# Patient Record
Sex: FEMALE
[De-identification: names, ages, dates, MRNs, and addresses within clinical notes are randomized per-mention and may not be internally consistent; named-entity substitution may affect disease eponyms.]

---

## 2022-01-06 ENCOUNTER — NURSE TRIAGE (OUTPATIENT)
Dept: OTHER | Facility: CLINIC | Age: 57
End: 2022-01-06

## 2022-01-06 NOTE — TELEPHONE ENCOUNTER
Subjective: Caller states \"I received my Covid booster during the week that I had Covid and didn't know it. Should I get another booster? \"     Advised patient that she will not need to receive a 2nd booster at this time; v/u. No further questions. Care advice provided, patient verbalizes understanding; denies any other questions or concerns; instructed to call back for any new or worsening symptoms. This triage is a result of a call to [address] [place] [address]. Please do not respond to the triage nurse through this encounter. Any subsequent communication should be directly with the patient.     Reason for Disposition   COVID-19 vaccine, Frequently Asked Questions (FAQs)    Protocols used: COVID-19 - VACCINE QUESTIONS AND REACTIONS-ADULT-AH